# Patient Record
Sex: MALE | Race: WHITE | Employment: OTHER | URBAN - METROPOLITAN AREA
[De-identification: names, ages, dates, MRNs, and addresses within clinical notes are randomized per-mention and may not be internally consistent; named-entity substitution may affect disease eponyms.]

---

## 2022-06-16 ENCOUNTER — OFFICE VISIT (OUTPATIENT)
Dept: ENT CLINIC | Age: 87
End: 2022-06-16
Payer: MEDICARE

## 2022-06-16 VITALS
SYSTOLIC BLOOD PRESSURE: 120 MMHG | WEIGHT: 211 LBS | HEIGHT: 70 IN | BODY MASS INDEX: 30.21 KG/M2 | DIASTOLIC BLOOD PRESSURE: 78 MMHG

## 2022-06-16 DIAGNOSIS — H61.23 BILATERAL IMPACTED CERUMEN: Primary | ICD-10-CM

## 2022-06-16 DIAGNOSIS — H92.22 BLOOD IN EAR CANAL, LEFT: ICD-10-CM

## 2022-06-16 PROCEDURE — 69210 REMOVE IMPACTED EAR WAX UNI: CPT | Performed by: PHYSICIAN ASSISTANT

## 2022-06-16 RX ORDER — LOSARTAN POTASSIUM 100 MG/1
100 TABLET ORAL DAILY
COMMUNITY
Start: 2022-06-16

## 2022-06-16 RX ORDER — ROSUVASTATIN CALCIUM 10 MG/1
10 TABLET, COATED ORAL
COMMUNITY
Start: 2022-06-16

## 2022-06-16 RX ORDER — DORZOLAMIDE HCL 20 MG/ML
1 SOLUTION/ DROPS OPHTHALMIC 2 TIMES DAILY
COMMUNITY
End: 2022-06-16

## 2022-06-16 RX ORDER — PSEUDOEPHEDRINE HCL 30 MG
100 TABLET ORAL DAILY
COMMUNITY

## 2022-06-16 RX ORDER — OMEPRAZOLE 20 MG/1
20 CAPSULE, DELAYED RELEASE ORAL DAILY
COMMUNITY
Start: 2022-06-16

## 2022-06-16 RX ORDER — OFLOXACIN 3 MG/ML
5 SOLUTION AURICULAR (OTIC) 2 TIMES DAILY
Qty: 1 EACH | Refills: 2 | Status: SHIPPED | OUTPATIENT
Start: 2022-06-16 | End: 2022-06-23

## 2022-06-16 RX ORDER — LATANOPROST 50 UG/ML
1 SOLUTION/ DROPS OPHTHALMIC DAILY
COMMUNITY
End: 2022-06-16

## 2022-06-16 RX ORDER — ASPIRIN 81 MG/1
81 TABLET, CHEWABLE ORAL DAILY
COMMUNITY

## 2022-06-16 NOTE — PROGRESS NOTES
Chief Complaint   Patient presents with    New Patient     Patient here for wax removal. He states that his ears are stopped up. He also states that he went to his pcp earlier today and they got some wax out and some of his hearing returned. HPI:  Fili Cruz is a 80 y.o. male seen for evaluation of his ears. He reports that he had water in the ear and was not able to hear. He states that he went to his PCP and they tried to flush his ears but caused pain. Patient reports that his hearing is better than it was initially. Past Medical History, Past Surgical History, Family history, Social History, and Medications were all reviewed with the patient today and updated as necessary. No Known Allergies  Patient Active Problem List   Diagnosis    HTN (hypertension)    Dysuria    Gross hematuria    Microscopic hematuria     Current Outpatient Medications   Medication Sig    losartan (COZAAR) 100 MG tablet Take 100 mg by mouth daily    omeprazole (PRILOSEC) 20 MG delayed release capsule Take 20 mg by mouth daily    rosuvastatin (CRESTOR) 10 MG tablet Take 10 mg by mouth    ofloxacin (FLOXIN OTIC) 0.3 % otic solution Place 5 drops into the left ear 2 times daily for 7 days    TAMSULOSIN HCL PO Take by mouth    aspirin 81 MG chewable tablet Take 81 mg by mouth daily    ciclopirox (PENLAC) 8 % solution     docusate (COLACE, DULCOLAX) 100 MG CAPS Take 100 mg by mouth daily     No current facility-administered medications for this visit. History reviewed. No pertinent past medical history. Social History     Tobacco Use    Smoking status: Not on file    Smokeless tobacco: Not on file   Substance Use Topics    Alcohol use: Not on file     History reviewed. No pertinent surgical history. History reviewed. No pertinent family history. ROS:    Review of Systems   Constitutional: Negative for fever. HENT: Negative for ear discharge. Eyes: Negative for discharge.    Respiratory: Negative for cough. Cardiovascular: Negative for chest pain. Musculoskeletal: Negative for neck pain. Skin: Negative for rash. Allergic/Immunologic: Negative for environmental allergies. Neurological: Negative for dizziness. Hematological: Negative for adenopathy. PHYSICAL EXAM:    /78 (Site: Left Upper Arm, Position: Sitting)   Ht 5' 10\" (1.778 m)   Wt 211 lb (95.7 kg)   BMI 30.28 kg/m²     Head  Head and Face - The head and face are atraumatic, normocephalic. The salivary glands are intact and the facial appearance is symmetric. Head shape - No scars, lesions, or masses    Ear  Ear - Bilateral cerumen impaction with blood in the left EAC. Pinna: bilateral - No hematomas or lacerations    Eye  Eyeball - bilateral - extraocular motions intact, equal in size and movement    Neck   Neck - Full range of motion and Supple. Trachea - Midline. Neurologic - II - XII Grossly intact bilaterally    Cardiac  Inspection - Jugular Vein:  Bilateral - non distended, no prominent pulsations    Chest and Lung  Inspection - Movements:  Chest symmetrical with bilateral expansion, respirations even and non labored           Cerumen Removal Procedure Note      PROCEDURE: Cerumen removal under binocular microscopy  INDICATIONS: Cerumen impaction  DESCRIPTION: After verbal consent was obtained and a timeout was performed, the otologic microscope was used to visualize both ears. There were normal appearing auricles bilaterally. There was cerumen impaction bilaterally. I cleaned out both ears under the scope w/ a right angle hook and otologic suctions. After cleaning, the right ear canal skin was healthy and both TMs were intact w/ no perforations. Both middle ear spaces were clear w/ no effusions. There were abrasions of the left EAC. The patient tolerated the procedure well and there were no complications.       ASSESSMENT and PLAN      ICD-10-CM    1. Bilateral impacted cerumen  H61.23 REMOVE IMPACTED EAR WAX   2. Blood in ear canal, left  H92.22        I will have the patient use floxin drops in the left EAC. He can return to the clinic as needed. Thank you for the opportunity to participate in the care of this patient. Please let me know if you have any further questions or concerns.     Michelle Benitez PA-C  6/20/2022

## 2022-06-20 ASSESSMENT — ENCOUNTER SYMPTOMS
COUGH: 0
EYE DISCHARGE: 0

## 2022-07-11 ENCOUNTER — OFFICE VISIT (OUTPATIENT)
Dept: ENT CLINIC | Age: 87
End: 2022-07-11

## 2022-07-11 VITALS
BODY MASS INDEX: 30.35 KG/M2 | HEIGHT: 70 IN | DIASTOLIC BLOOD PRESSURE: 80 MMHG | SYSTOLIC BLOOD PRESSURE: 130 MMHG | WEIGHT: 212 LBS

## 2022-07-11 DIAGNOSIS — H92.22 BLOOD IN EAR CANAL, LEFT: Primary | ICD-10-CM

## 2022-07-11 RX ORDER — CIPROFLOXACIN 500 MG/1
500 TABLET, FILM COATED ORAL 2 TIMES DAILY
COMMUNITY
Start: 2022-06-30 | End: 2022-07-14

## 2022-07-11 RX ORDER — BRIMONIDINE TARTRATE 0.15 %
1 DROPS OPHTHALMIC (EYE) 2 TIMES DAILY
COMMUNITY
End: 2022-07-11 | Stop reason: ALTCHOICE

## 2022-07-11 RX ORDER — BRIMONIDINE TARTRATE, TIMOLOL MALEATE 2; 5 MG/ML; MG/ML
1 SOLUTION/ DROPS OPHTHALMIC EVERY 12 HOURS
COMMUNITY

## 2022-07-11 RX ORDER — CIPROFLOXACIN 500 MG/1
TABLET, FILM COATED ORAL
COMMUNITY
Start: 2022-06-30 | End: 2022-07-11 | Stop reason: ALTCHOICE

## 2022-07-11 RX ORDER — NITROGLYCERIN 0.4 MG/1
TABLET SUBLINGUAL
COMMUNITY

## 2022-07-11 RX ORDER — CARVEDILOL 3.12 MG/1
TABLET ORAL 2 TIMES DAILY WITH MEALS
COMMUNITY

## 2022-07-11 ASSESSMENT — ENCOUNTER SYMPTOMS
EYE DISCHARGE: 0
COUGH: 0
ABDOMINAL PAIN: 0

## 2022-07-11 NOTE — PROGRESS NOTES
pain.    Eyes: Negative for discharge. Respiratory: Negative for cough. Cardiovascular: Negative for chest pain. Gastrointestinal: Negative for abdominal pain. Musculoskeletal: Negative for arthralgias and neck pain. Skin: Negative for rash. Allergic/Immunologic: Negative for environmental allergies. Neurological: Negative for dizziness. Hematological: Negative for adenopathy. Psychiatric/Behavioral: Negative for agitation. PHYSICAL EXAM:    /80 (Site: Left Upper Arm, Position: Sitting)   Ht 5' 10\" (1.778 m)   Wt 212 lb (96.2 kg)   BMI 30.42 kg/m²     Head  Head and Face - The head and face are atraumatic, normocephalic. The salivary glands are intact and the facial appearance is symmetric. Head shape - No scars, lesions, or masses    Ear  Ear - Tympanic membranes are clear, the external auditory canal is without discharge and the tympanic membranes are mobile. There is no tympanic membrane erythema and no middle ear opacity is visualized. Small area of healing tissue at the inferior canal.   Pinna: bilateral - No hematomas or lacerations    Eye  Eyeball - bilateral - extraocular motions intact, equal in size and movement    Neck   Neck - Full range of motion and Supple. Trachea - Midline. Neurologic - II - XII Grossly intact bilaterally    Cardiac  Inspection - Jugular Vein:  Bilateral - non distended, no prominent pulsations    Chest and Lung  Inspection - Movements:  Chest symmetrical with bilateral expansion, respirations even and non labored    ASSESSMENT and PLAN      ICD-10-CM    1. Blood in ear canal, left  H92.22        Patient reassured about ear exam. He can return to the clinic as needed.      Khadar Barrett PA-C  7/11/2022

## 2024-01-30 ENCOUNTER — OFFICE VISIT (OUTPATIENT)
Dept: ENT CLINIC | Age: 89
End: 2024-01-30
Payer: MEDICARE

## 2024-01-30 VITALS
DIASTOLIC BLOOD PRESSURE: 82 MMHG | WEIGHT: 212 LBS | BODY MASS INDEX: 30.35 KG/M2 | HEIGHT: 70 IN | SYSTOLIC BLOOD PRESSURE: 130 MMHG

## 2024-01-30 DIAGNOSIS — H61.23 BILATERAL IMPACTED CERUMEN: Primary | Chronic | ICD-10-CM

## 2024-01-30 PROCEDURE — 69210 REMOVE IMPACTED EAR WAX UNI: CPT | Performed by: PHYSICIAN ASSISTANT

## 2024-01-30 PROCEDURE — G8484 FLU IMMUNIZE NO ADMIN: HCPCS | Performed by: PHYSICIAN ASSISTANT

## 2024-01-30 PROCEDURE — 99213 OFFICE O/P EST LOW 20 MIN: CPT | Performed by: PHYSICIAN ASSISTANT

## 2024-01-30 PROCEDURE — 1123F ACP DISCUSS/DSCN MKR DOCD: CPT | Performed by: PHYSICIAN ASSISTANT

## 2024-01-30 PROCEDURE — 1036F TOBACCO NON-USER: CPT | Performed by: PHYSICIAN ASSISTANT

## 2024-01-30 PROCEDURE — G8427 DOCREV CUR MEDS BY ELIG CLIN: HCPCS | Performed by: PHYSICIAN ASSISTANT

## 2024-01-30 PROCEDURE — G8419 CALC BMI OUT NRM PARAM NOF/U: HCPCS | Performed by: PHYSICIAN ASSISTANT

## 2024-01-30 RX ORDER — DORZOLAMIDE HCL 20 MG/ML
SOLUTION/ DROPS OPHTHALMIC
COMMUNITY
Start: 2023-12-28

## 2024-01-30 RX ORDER — FINASTERIDE 5 MG/1
5 TABLET, FILM COATED ORAL DAILY
COMMUNITY
Start: 2023-01-30

## 2024-01-30 RX ORDER — LATANOPROST 50 UG/ML
SOLUTION/ DROPS OPHTHALMIC
COMMUNITY
Start: 2024-01-18

## 2024-01-30 RX ORDER — NEOMYCIN SULFATE, POLYMYXIN B SULFATE, AND DEXAMETHASONE 3.5; 10000; 1 MG/G; [USP'U]/G; MG/G
OINTMENT OPHTHALMIC
COMMUNITY
Start: 2024-01-18

## 2024-01-30 ASSESSMENT — ENCOUNTER SYMPTOMS
ALLERGIC/IMMUNOLOGIC NEGATIVE: 1
GASTROINTESTINAL NEGATIVE: 1
RESPIRATORY NEGATIVE: 1
EYES NEGATIVE: 1

## 2024-01-30 NOTE — PROGRESS NOTES
error  
Cavity: Nasal mucosa is pink/ moist.  Nasal septum is midline.  Inferior turbinates are Hypertrophic.  Both nasal passages are clear, no polyps or abnormal drainage.    Lips/Gums/Teeth: Inspection of lips, gums and teeth are normal  Oral Cavity: normal oral mucosa, no visualized ulcers, masses or other lesions,  Palate normal, Tongue normal, Floor of mouth normal. Mallampati Class 2 .  Oropharynx: Oropharynx normal and unobstructed, tonsils are  + 1  , no lesion or inflammation.     Neck/Thyroid: Normal appearance without mass, Trachea midline, No lymphadenopathy, No enlargement, tenderness or mass of thyroid noted.      Procedure: Otomicroscopy with cerumen removal requiring instrumentation    Procedure: Using a microscope, the external auditory canal of both ears were inspected.  A suction and curette  were used to atraumatically remove wax impacted into the lateral aspect of the right and left EAC.  The TM's were then inspected with the above findings.    Complications: The patient had no complications during or immediately following the procedure. The patient tolerated the procedure well and left the clinic in good condition.      Assessment/Plan:  1. Bilateral impacted cerumen  -     REMOVE IMPACTED EAR WAX    Pt tolerated EWR well, will use mineral oil to prevent buildup.         Return if symptoms worsen or fail to improve.    Patient agrees with this plan.        JOSE Kaufman    This note was generated using voice recognition software, please excuse any typos.

## 2025-09-04 ENCOUNTER — OFFICE VISIT (OUTPATIENT)
Dept: ENT CLINIC | Age: 89
End: 2025-09-04
Payer: MEDICARE

## 2025-09-04 VITALS — BODY MASS INDEX: 29.06 KG/M2 | WEIGHT: 203 LBS | HEIGHT: 70 IN

## 2025-09-04 DIAGNOSIS — H61.23 BILATERAL IMPACTED CERUMEN: Primary | ICD-10-CM

## 2025-09-04 PROCEDURE — 1123F ACP DISCUSS/DSCN MKR DOCD: CPT | Performed by: OTOLARYNGOLOGY

## 2025-09-04 PROCEDURE — 69210 REMOVE IMPACTED EAR WAX UNI: CPT | Performed by: OTOLARYNGOLOGY

## 2025-09-04 PROCEDURE — G8427 DOCREV CUR MEDS BY ELIG CLIN: HCPCS | Performed by: OTOLARYNGOLOGY

## 2025-09-04 PROCEDURE — 1159F MED LIST DOCD IN RCRD: CPT | Performed by: OTOLARYNGOLOGY

## 2025-09-04 PROCEDURE — 99213 OFFICE O/P EST LOW 20 MIN: CPT | Performed by: OTOLARYNGOLOGY

## 2025-09-04 PROCEDURE — 1036F TOBACCO NON-USER: CPT | Performed by: OTOLARYNGOLOGY

## 2025-09-04 PROCEDURE — G8419 CALC BMI OUT NRM PARAM NOF/U: HCPCS | Performed by: OTOLARYNGOLOGY

## 2025-09-04 RX ORDER — BRIMONIDINE TARTRATE 1.5 MG/ML
1 SOLUTION/ DROPS OPHTHALMIC
COMMUNITY

## 2025-09-04 RX ORDER — TORSEMIDE 10 MG/1
10 TABLET ORAL DAILY
COMMUNITY
Start: 2025-08-18

## 2025-09-04 RX ORDER — MAGNESIUM OXIDE 400 MG/1
400 TABLET ORAL DAILY
COMMUNITY
Start: 2025-08-18

## 2025-09-04 RX ORDER — BRIMONIDINE TARTRATE 2 MG/ML
SOLUTION/ DROPS OPHTHALMIC
COMMUNITY
Start: 2025-08-26

## 2025-09-04 RX ORDER — SPIRONOLACTONE 25 MG/1
25 TABLET ORAL DAILY
COMMUNITY
Start: 2025-05-30

## 2025-09-04 RX ORDER — TRIAMCINOLONE ACETONIDE 1 MG/G
CREAM TOPICAL 2 TIMES DAILY
COMMUNITY
Start: 2025-02-13